# Patient Record
Sex: FEMALE | Race: WHITE | NOT HISPANIC OR LATINO | URBAN - METROPOLITAN AREA
[De-identification: names, ages, dates, MRNs, and addresses within clinical notes are randomized per-mention and may not be internally consistent; named-entity substitution may affect disease eponyms.]

---

## 2020-01-03 ENCOUNTER — IMPORTED ENCOUNTER (OUTPATIENT)
Dept: URBAN - METROPOLITAN AREA CLINIC 50 | Facility: CLINIC | Age: 75
End: 2020-01-03

## 2020-01-06 ENCOUNTER — IMPORTED ENCOUNTER (OUTPATIENT)
Dept: URBAN - METROPOLITAN AREA CLINIC 50 | Facility: CLINIC | Age: 75
End: 2020-01-06

## 2020-01-06 NOTE — PATIENT DISCUSSION
"""Use artificial tears 2-4 times a day in both eyes (Systane Complete or Refresh Advanced). "" ""Start Artificial tears both eyes two - four times a day

## 2020-03-16 ENCOUNTER — IMPORTED ENCOUNTER (OUTPATIENT)
Dept: URBAN - METROPOLITAN AREA CLINIC 50 | Facility: CLINIC | Age: 75
End: 2020-03-16

## 2021-04-17 ASSESSMENT — VISUAL ACUITY
OS_SC: 20/50-1
OD_CC: J1@ 16 IN
OS_CC: J1@ 16 IN
OD_PH: 20/25-1
OS_PH: 20/25
OD_SC: 20/40+2

## 2021-04-17 ASSESSMENT — TONOMETRY
OS_IOP_MMHG: 13
OD_IOP_MMHG: 12